# Patient Record
(demographics unavailable — no encounter records)

---

## 2024-10-10 NOTE — HISTORY OF PRESENT ILLNESS
[FreeTextEntry6] : 19-month-old being followed up for an ear check. If well, to receive flu vaccine today. Patient is doing well he completed course of amoxicillin Patient fell at home and hit dresser approximately 4 days ago mom states no vomiting no change in behavior has been acting well but has small bump on the left upper hairline.  There is also dried blood at the site.

## 2024-10-10 NOTE — PHYSICAL EXAM
[Clear] : right tympanic membrane clear [NL] : warm, clear [FreeTextEntry2] : Small puncture/laceration left forehead x 4 days ago crusted dried blood no bogginess no discharge noted [FreeTextEntry3] : TMs clear bilaterally no effusion good landmarks

## 2024-10-10 NOTE — DISCUSSION/SUMMARY
[FreeTextEntry1] : This patient presents for follow up on otitis media. Patient has completed course of antibiotics as directed and tolerated them well. Patient is currently feeling well. The Physical Examination is within normal limits- tympanic membranes show good landmarks. Patient to follow-up as needed. Head trauma-small laceration puncture left upper hairline at forehead patient has dried blood minimal swelling no discharge noted no tenderness. Advised mother at this point appears to be healing well may have small scar.  Advised in the next 24 to 48 hours okay to wash hair and remove dried blood.  Patient presents for influenza vaccination. Patient currently is healthy. Vaccination side effects were discussed with parents and VIS form was given.  The components of today's vaccine/ vaccinations and the disease(s) for which they are intended to prevent have been discussed with the caretaker. The caretaker has given consent to vaccinate. Total time dedicated to this patient visit , including preparing to see the patient ( eg.. Review of chart, any pertinent labs ect ) obtaining and/ or reviewing separately obtained history, performing medical exam, evaluation, counseling and educating patient and family member, ordering any needed medication or labs and documenting clinical information in the electronic medical record to patient / parent ____20___ minutes.

## 2024-10-21 NOTE — HISTORY OF PRESENT ILLNESS
[de-identified] : penis swelling  [FreeTextEntry6] : 19 m/o patient presents today for swelling in the genital area, x1 day. Area is sensitive and painful. Yesterday notice child sqirming and uncomfortable when held   No pain or discomfort notice when urinating. Parents have been using rash cream on the swelling, last used this afternoon. No fevers.  3 days prior  toddler had large loose stool - dad uncertain if cross contaminated  uncircumcised male- today at 3 pm put in bath tub- toddler very fussy today

## 2024-10-21 NOTE — DISCUSSION/SUMMARY
[FreeTextEntry1] :  on Balanitis  URINE CX ordered- parents to bring back sample Sit in warm bath 3 x day  start on KEFLEX  50 mg/kg/ bid x 10 days  will call parents with results

## 2024-10-21 NOTE — PHYSICAL EXAM
[Forest: ____] : Forest [unfilled] [Normal External Genitalia] : normal external genitalia [NL] : warm, clear [Tired appearing] : not tired appearing [Toxic] : not toxic [Conjuctival Injection] : no conjunctival injection [Circumcised] : uncircumcised [FreeTextEntry5] : tears with crying  [FreeTextEntry6] : swelling of penis noted with tightening of foreskin   no redness or swelling of testicles

## 2025-02-03 NOTE — HISTORY OF PRESENT ILLNESS
[FreeTextEntry6] : 22 m/o patient presents today for limping on right foot, x5 days. Last Monday, fell off a chair. Limp got worse over this past weekend. Is also holding his back and saying it hurts. No fevers. Elevated temperature in office at 99.2F.

## 2025-02-03 NOTE — PHYSICAL EXAM
[Moves All Extremities x 4] : moves all extremities x4 [NL] : warm, clear [de-identified] : negative for swelling , redness or bruising  able to move all extremities without limitation

## 2025-02-03 NOTE — DISCUSSION/SUMMARY
[FreeTextEntry1] : This is a 22-month-old male child that is here today with a history of limping on and off for the past week.Mom states that proximately 1 week ago child fell from a child chair.  He cried immediately but then was able to return to his normal routine.  Mom states that 2 days later she noticed that intermittently the child would limp.  States that this weekend he is with playing with other friends and this morning the limp appears more persistent.  There is no history of any recent illness and child is otherwise well.  His physical examination today is within normal limits he is able to lose move all extremities equally without limitation.  There is no swelling no ecchymosis nor tenderness noted on examinations of his lower extremities he is able to flex them and extend them without any limitation of movement or discomfort.  Child observed walking in office tends to be limping on the right leg. Diuscussed possibility of muscle strain and may give tyleol or advil top see f this offers rfelief Based on clinical presentation and history child was referred to orthopedics for further evaluation. Total time dedicated to this patient's visit includes preparing to see patient  obtaining and/or reviewing separately obtained history from patient and parent,  discussing symptoms ,  physical exam and medication recommendations Time :25

## 2025-03-12 NOTE — DISCUSSION/SUMMARY
[Normal Growth] : growth [Normal Development] : development [None] : No known medical problems [No Elimination Concerns] : elimination [No Feeding Concerns] : feeding [No Skin Concerns] : skin [Normal Sleep Pattern] : sleep [Assessment of Language Development] : assessment of language development [Temperament and Behavior] : temperament and behavior [Toilet Training] : toilet training [TV Viewing] : tv viewing [Safety] : safety [No Medications] : ~He/She~ is not on any medications [Parent/Guardian] : parent/guardian [FreeTextEntry1] : 24 month male here for well-visit, appropriate growth and development observed. Continue cow's milk. Continue table foods, 3 meals with 2-3 snacks per day. Incorporate flourinated water daily in a sippy cup. Brush teeth twice a day with soft toothbrush. Recommend visit to dentist. When in car, keep child in rear-facing car seats until age 2, or until  the maximum height and weight for seat is reached. Put toddler to sleep in own bed. Help toddler to maintain consistent daily routines and sleep schedule. Toilet training discussed. Ensure home is safe. Use consistent, positive discipline. Read aloud to toddler. Limit screen time to no more than 2 hours per day. Physical exam is within normal limits vaccines discussed and none given too soon for Hep A #2 follow up in 6 months for routine

## 2025-03-12 NOTE — HISTORY OF PRESENT ILLNESS
[Mother] : mother [Cow's milk (Ounces per day ___)] : consumes [unfilled] oz of Cow's milk per day [Fruit] : fruit [Vegetables] : vegetables [Meat] : meat [Cereal] : cereal [Eggs] : eggs [Finger Foods] : finger foods [Table food] : table food [Dairy] : dairy [Vitamins] : Patient takes vitamin daily [___ stools per day] : [unfilled]  stools per day [Firm] : stools are firm consistency [___ voids per day] : [unfilled] voids per day [Normal] : Normal [In crib] : In crib [Sippy cup use] : Sippy cup use [Brushing teeth] : Brushing teeth [Yes] : Patient goes to dentist yearly [Playtime 60 min a day] : Playtime 60 min a day [Toilet Training] : Toilet training [<2 hrs of screen time] : Less than 2 hrs of screen time [Water heater temperature set at <120 degrees F] : Water heater temperature set at <120 degrees F [Car seat in back seat] : Car seat in back seat [Smoke Detectors] : Smoke detectors [Carbon Monoxide Detectors] : Carbon monoxide detectors [Up to date] : Up to date [NO] : No [Exposure to electronic nicotine delivery system] : No exposure to electronic nicotine delivery system [At risk for exposure to TB] : Not at risk for exposure to Tuberculosis [FreeTextEntry9] : advise for toilet training [FreeTextEntry1] : This is a 24 month M here for routine exam and immunizations . parents deny any recent illnesses or ER visits

## 2025-03-14 NOTE — DISCUSSION/SUMMARY
[FreeTextEntry1] : 24 month male with viral URI and left AOM.  Complete antibiotics as prescribed. Provide antipyretics as needed for pain or fever.  Encourage fluids and rest.  If no improvement or symptoms worsen within 48 hours return for re-evaluation. Return to office for follow up in 2-3 wks. Continue nasal saline and suctioning for nasal congestion.

## 2025-03-14 NOTE — REVIEW OF SYSTEMS
[Fever] : fever [Ear Tugging] : ear tugging [Nasal Discharge] : nasal discharge [Nasal Congestion] : nasal congestion [Cough] : cough [Vomiting] : no vomiting [Diarrhea] : no diarrhea [Abdominal Pain] : abdominal pain [Negative] : Genitourinary

## 2025-03-14 NOTE — HISTORY OF PRESENT ILLNESS
[FreeTextEntry6] : 3 y/o patient presents with intermittent fevers highest 101F (mostly spikes at night), sneezing, stomach pain, and pulling ears x 4 days. Mother has been giving Tylenol/Motrin at home. Patient is afebrile in office. Sister was also sick first.

## 2025-03-14 NOTE — PHYSICAL EXAM
[Playful] : playful [Clear] : right tympanic membrane clear [Erythema] : erythema [Purulent Effusion] : purulent effusion [Clear Rhinorrhea] : clear rhinorrhea [NL] : no abnormal lymph nodes palpated [FreeTextEntry7] : mucous/phlegm clears with cough mostly

## 2025-03-24 NOTE — DEVELOPMENTAL MILESTONES
[Normal Development] : Normal Development [None] : none [Plays alongside other children] : plays alongside other children [Takes off some clothing] : takes off some clothing [Scoops well with spoon] : scoops well with spoon [Uses 50 words] : uses 50 words [Follows 2-step command] : follows 2-step command [Uses words that are 50% intelligible] : uses words that are 50% intelligible to strangers [Kicks ball] : kicks ball  [Jumps off ground with 2 feet] : jumps off ground with 2 feet [Runs with coordination] : runs with coordination [Climbs up a ladder at a] : climbs up a ladder at a playground [Stacks objects] : stacks objects [Turns book pages] : turns book pages [Uses hands to turn objects] : uses hands to turn objects [Combine 2 words into phrase or] : combines 2 words into phrase or sentences

## 2025-03-24 NOTE — HISTORY OF PRESENT ILLNESS
[Parents] : parents [Fruit] : fruit [Vegetables] : vegetables [Meat] : meat [Cereal] : cereal [Finger Foods] : finger foods [Table food] : table food [Dairy] : dairy [Vitamins] : Patient takes vitamin daily [___ stools per day] : [unfilled]  stools per day [Firm] : stools are firm consistency [___ voids per day] : [unfilled] voids per day [Normal] : Normal [Sippy cup use] : Sippy cup use [Playtime 60 min a day] : Playtime 60 min a day [Toilet Training] : Toilet training [<2 hrs of screen time] : Less than 2 hrs of screen time [Water heater temperature set at <120 degrees F] : Water heater temperature set at <120 degrees F [Car seat in back seat] : Car seat in back seat [Smoke Detectors] : Smoke detectors [Carbon Monoxide Detectors] : Carbon monoxide detectors [Up to date] : Up to date [NO] : No [No] : Patient does not go to dentist yearly [At risk for exposure to TB] : Not at risk for exposure to Tuberculosis [de-identified] : vegetarian diet  [FreeTextEntry9] : discussed toilet training [FreeTextEntry1] : This is a 24 month M here for routine exam and immunizations . He has just comp[leted a 10 day course of antibiotics for an otits media

## 2025-03-24 NOTE — DISCUSSION/SUMMARY
[Normal Growth] : growth [Normal Development] : development [None] : No known medical problems [No Elimination Concerns] : elimination [No Feeding Concerns] : feeding [No Skin Concerns] : skin [Normal Sleep Pattern] : sleep [Assessment of Language Development] : assessment of language development [Temperament and Behavior] : temperament and behavior [Toilet Training] : toilet training [TV Viewing] : tv viewing [Safety] : safety [No Medications] : ~He/She~ is not on any medications [Parent/Guardian] : parent/guardian [FreeTextEntry1] : 24 month male here for well-visit, appropriate growth and development observed. Continue cow's milk. Continue table foods, 3 meals with 2-3 snacks per day. Incorporate flourinated water daily in a sippy cup. Brush teeth twice a day with soft toothbrush. Recommend visit to dentist. When in car, keep child in rear-facing car seats until age 2, or until  the maximum height and weight for seat is reached. Put toddler to sleep in own bed. Help toddler to maintain consistent daily routines and sleep schedule. Toilet training discussed. Ensure home is safe. Use consistent, positive discipline. Read aloud to toddler. Limit screen time to no more than 2 hours per day. Physical exam is within normal limits vaccines discussed and administered and child is up to date

## 2025-03-24 NOTE — PHYSICAL EXAM
[Alert] : alert [No Acute Distress] : no acute distress [Normocephalic] : normocephalic [Anterior [place] Closed] : anterior fontanelle closed [Red Reflex Bilateral] : red reflex bilateral [PERRL] : PERRL [Normally Placed Ears] : normally placed ears [Auricles Well Formed] : auricles well formed [Clear Tympanic membranes with present light reflex and bony landmarks] : clear tympanic membranes with present light reflex and bony landmarks [No Discharge] : no discharge [Nares Patent] : nares patent [Palate Intact] : palate intact [Uvula Midline] : uvula midline [Tooth Eruption] : tooth eruption  [Supple, full passive range of motion] : supple, full passive range of motion [No Palpable Masses] : no palpable masses [Symmetric Chest Rise] : symmetric chest rise [Clear to Auscultation Bilaterally] : clear to auscultation bilaterally [Regular Rate and Rhythm] : regular rate and rhythm [S1, S2 present] : S1, S2 present [No Murmurs] : no murmurs [+2 Femoral Pulses] : +2 femoral pulses [Soft] : soft [NonTender] : non tender [Non Distended] : non distended [Normoactive Bowel Sounds] : normoactive bowel sounds [No Hepatomegaly] : no hepatomegaly [No Splenomegaly] : no splenomegaly [Central Urethral Opening] : central urethral opening [Testicles Descended Bilaterally] : testicles descended bilaterally [Patent] : patent [Normally Placed] : normally placed [No Abnormal Lymph Nodes Palpated] : no abnormal lymph nodes palpated [No Clavicular Crepitus] : no clavicular crepitus [Symmetric Buttocks Creases] : symmetric buttocks creases [No Spinal Dimple] : no spinal dimple [NoTuft of Hair] : no tuft of hair [Cranial Nerves Grossly Intact] : cranial nerves grossly intact [No Rash or Lesions] : no rash or lesions